# Patient Record
Sex: FEMALE | HISPANIC OR LATINO | Employment: UNEMPLOYED | URBAN - METROPOLITAN AREA
[De-identification: names, ages, dates, MRNs, and addresses within clinical notes are randomized per-mention and may not be internally consistent; named-entity substitution may affect disease eponyms.]

---

## 2019-12-04 ENCOUNTER — HOSPITAL ENCOUNTER (EMERGENCY)
Facility: MEDICAL CENTER | Age: 64
End: 2019-12-04
Attending: EMERGENCY MEDICINE
Payer: MEDICARE

## 2019-12-04 VITALS
WEIGHT: 136 LBS | OXYGEN SATURATION: 95 % | TEMPERATURE: 96.4 F | BODY MASS INDEX: 25.68 KG/M2 | DIASTOLIC BLOOD PRESSURE: 82 MMHG | RESPIRATION RATE: 16 BRPM | SYSTOLIC BLOOD PRESSURE: 134 MMHG | HEART RATE: 69 BPM | HEIGHT: 61 IN

## 2019-12-04 DIAGNOSIS — F33.9 EPISODE OF RECURRENT MAJOR DEPRESSIVE DISORDER, UNSPECIFIED DEPRESSION EPISODE SEVERITY (HCC): ICD-10-CM

## 2019-12-04 DIAGNOSIS — T50.904A DRUG OVERDOSE, UNDETERMINED INTENT, INITIAL ENCOUNTER: ICD-10-CM

## 2019-12-04 LAB
ALBUMIN SERPL BCP-MCNC: 3.8 G/DL (ref 3.2–4.9)
ALBUMIN/GLOB SERPL: 1.3 G/DL
ALP SERPL-CCNC: 95 U/L (ref 30–99)
ALT SERPL-CCNC: 45 U/L (ref 2–50)
AMPHET UR QL SCN: NEGATIVE
ANION GAP SERPL CALC-SCNC: 6 MMOL/L (ref 0–11.9)
APAP SERPL-MCNC: <10 UG/ML (ref 10–30)
AST SERPL-CCNC: 45 U/L (ref 12–45)
BARBITURATES UR QL SCN: NEGATIVE
BASOPHILS # BLD AUTO: 0.8 % (ref 0–1.8)
BASOPHILS # BLD: 0.05 K/UL (ref 0–0.12)
BENZODIAZ UR QL SCN: POSITIVE
BILIRUB SERPL-MCNC: 0.4 MG/DL (ref 0.1–1.5)
BUN SERPL-MCNC: 20 MG/DL (ref 8–22)
BZE UR QL SCN: NEGATIVE
CALCIUM SERPL-MCNC: 8.9 MG/DL (ref 8.5–10.5)
CANNABINOIDS UR QL SCN: NEGATIVE
CHLORIDE SERPL-SCNC: 105 MMOL/L (ref 96–112)
CO2 SERPL-SCNC: 28 MMOL/L (ref 20–33)
CREAT SERPL-MCNC: 1.07 MG/DL (ref 0.5–1.4)
EKG IMPRESSION: NORMAL
EOSINOPHIL # BLD AUTO: 0.21 K/UL (ref 0–0.51)
EOSINOPHIL NFR BLD: 3.3 % (ref 0–6.9)
ERYTHROCYTE [DISTWIDTH] IN BLOOD BY AUTOMATED COUNT: 48.9 FL (ref 35.9–50)
ETHANOL BLD-MCNC: 0 G/DL
GLOBULIN SER CALC-MCNC: 2.9 G/DL (ref 1.9–3.5)
GLUCOSE SERPL-MCNC: 111 MG/DL (ref 65–99)
HCG SERPL QL: NEGATIVE
HCT VFR BLD AUTO: 35.5 % (ref 37–47)
HGB BLD-MCNC: 11.4 G/DL (ref 12–16)
IMM GRANULOCYTES # BLD AUTO: 0.02 K/UL (ref 0–0.11)
IMM GRANULOCYTES NFR BLD AUTO: 0.3 % (ref 0–0.9)
LIPASE SERPL-CCNC: 24 U/L (ref 11–82)
LYMPHOCYTES # BLD AUTO: 2.87 K/UL (ref 1–4.8)
LYMPHOCYTES NFR BLD: 44.7 % (ref 22–41)
MCH RBC QN AUTO: 32.3 PG (ref 27–33)
MCHC RBC AUTO-ENTMCNC: 32.1 G/DL (ref 33.6–35)
MCV RBC AUTO: 100.6 FL (ref 81.4–97.8)
METHADONE UR QL SCN: NEGATIVE
MONOCYTES # BLD AUTO: 0.42 K/UL (ref 0–0.85)
MONOCYTES NFR BLD AUTO: 6.5 % (ref 0–13.4)
NEUTROPHILS # BLD AUTO: 2.85 K/UL (ref 2–7.15)
NEUTROPHILS NFR BLD: 44.4 % (ref 44–72)
NRBC # BLD AUTO: 0 K/UL
NRBC BLD-RTO: 0 /100 WBC
OPIATES UR QL SCN: NEGATIVE
OXYCODONE UR QL SCN: NEGATIVE
PCP UR QL SCN: NEGATIVE
PLATELET # BLD AUTO: 212 K/UL (ref 164–446)
PMV BLD AUTO: 10.3 FL (ref 9–12.9)
POC BREATHALIZER: 0 PERCENT (ref 0–0.01)
POTASSIUM SERPL-SCNC: 3.2 MMOL/L (ref 3.6–5.5)
PROPOXYPH UR QL SCN: NEGATIVE
PROT SERPL-MCNC: 6.7 G/DL (ref 6–8.2)
RBC # BLD AUTO: 3.53 M/UL (ref 4.2–5.4)
SALICYLATES SERPL-MCNC: 0 MG/DL (ref 15–25)
SODIUM SERPL-SCNC: 139 MMOL/L (ref 135–145)
WBC # BLD AUTO: 6.4 K/UL (ref 4.8–10.8)

## 2019-12-04 PROCEDURE — 80307 DRUG TEST PRSMV CHEM ANLYZR: CPT

## 2019-12-04 PROCEDURE — 83690 ASSAY OF LIPASE: CPT

## 2019-12-04 PROCEDURE — 80053 COMPREHEN METABOLIC PANEL: CPT

## 2019-12-04 PROCEDURE — 85025 COMPLETE CBC W/AUTO DIFF WBC: CPT

## 2019-12-04 PROCEDURE — A9270 NON-COVERED ITEM OR SERVICE: HCPCS | Performed by: STUDENT IN AN ORGANIZED HEALTH CARE EDUCATION/TRAINING PROGRAM

## 2019-12-04 PROCEDURE — 700102 HCHG RX REV CODE 250 W/ 637 OVERRIDE(OP): Performed by: STUDENT IN AN ORGANIZED HEALTH CARE EDUCATION/TRAINING PROGRAM

## 2019-12-04 PROCEDURE — 302970 POC BREATHALIZER: Performed by: EMERGENCY MEDICINE

## 2019-12-04 PROCEDURE — 84703 CHORIONIC GONADOTROPIN ASSAY: CPT

## 2019-12-04 PROCEDURE — 36415 COLL VENOUS BLD VENIPUNCTURE: CPT

## 2019-12-04 PROCEDURE — 99284 EMERGENCY DEPT VISIT MOD MDM: CPT | Performed by: PSYCHIATRY & NEUROLOGY

## 2019-12-04 PROCEDURE — 93005 ELECTROCARDIOGRAM TRACING: CPT | Performed by: EMERGENCY MEDICINE

## 2019-12-04 PROCEDURE — 700105 HCHG RX REV CODE 258: Performed by: EMERGENCY MEDICINE

## 2019-12-04 PROCEDURE — 99285 EMERGENCY DEPT VISIT HI MDM: CPT

## 2019-12-04 RX ORDER — BUSPIRONE HYDROCHLORIDE 10 MG/1
10 TABLET ORAL 2 TIMES DAILY
Status: DISCONTINUED | OUTPATIENT
Start: 2019-12-04 | End: 2019-12-04 | Stop reason: HOSPADM

## 2019-12-04 RX ORDER — QUETIAPINE FUMARATE 100 MG/1
200 TABLET, FILM COATED ORAL EVERY EVENING
Status: DISCONTINUED | OUTPATIENT
Start: 2019-12-04 | End: 2019-12-04 | Stop reason: HOSPADM

## 2019-12-04 RX ORDER — PROPRANOLOL HYDROCHLORIDE 10 MG/1
10 TABLET ORAL TWICE DAILY
Status: DISCONTINUED | OUTPATIENT
Start: 2019-12-04 | End: 2019-12-04 | Stop reason: HOSPADM

## 2019-12-04 RX ORDER — SODIUM CHLORIDE 9 MG/ML
1000 INJECTION, SOLUTION INTRAVENOUS ONCE
Status: COMPLETED | OUTPATIENT
Start: 2019-12-04 | End: 2019-12-04

## 2019-12-04 RX ORDER — CLONAZEPAM 0.5 MG/1
0.5 TABLET ORAL 2 TIMES DAILY
Status: DISCONTINUED | OUTPATIENT
Start: 2019-12-04 | End: 2019-12-04 | Stop reason: HOSPADM

## 2019-12-04 RX ADMIN — PROPRANOLOL HYDROCHLORIDE 10 MG: 10 TABLET ORAL at 15:03

## 2019-12-04 RX ADMIN — SERTRALINE HYDROCHLORIDE 50 MG: 50 TABLET ORAL at 15:03

## 2019-12-04 RX ADMIN — SODIUM CHLORIDE 1000 ML: 9 INJECTION, SOLUTION INTRAVENOUS at 05:18

## 2019-12-04 NOTE — ED NOTES
Woken from sleep, easily     Hourly rounding completed  Patient is up to date on current plan of care  Patient is resting comfortably in bed   Patient denies any need for use of the restroom, denies need for repositioning, denies need for any comfort care at this time     Sitter remains at bedside for 1:1 continuous observation
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
## 2019-12-04 NOTE — PSYCHIATRY
BRIEF PSYCHIATRIC CONSULT NOTE: patient seen, full note to follow.  -Legal Hold:extended  -Sitter:yes  -Restrictions:   -phone:yes      -Orders Placed: routine  -Assessment:    PT is admitting to wanting to die and feeling better off dead.     -Plan:continue to follow     Restarting antidepressant

## 2019-12-04 NOTE — ED NOTES
Ambulated well to the bathroom     Continuous observation maintained by RN     Provided with materials for brushing her teeth, deodorant, and hair comb; items used under observation only

## 2019-12-04 NOTE — ED NOTES
Per the Psychiatrist, patient now admitting to active thoughts and desire to commit suicide, will stay for transfer

## 2019-12-04 NOTE — ED NOTES
Never ate the lunch provided to her, she was offered a frozen meal from ED supply, to which she agreed

## 2019-12-04 NOTE — ED NOTES
Spoke with Dannielle at [facility], gave RN to RN; awaiting to hear back about possible admission

## 2019-12-04 NOTE — DISCHARGE PLANNING
Medical Social Work    Referral: Legal Hold    Intervention: Legal Hold Paperwork given to SW by [employer] RN Ximena Henderson    Legal Hold Initiated: Date: 12- Time: 1053    Patient’s Insurance Listed on Face Sheet: [employer] Medicare HMO    Referrals sent to: [facility], [facility] , [facility], [facility]    This referral contains the following information:  1) Face sheet __x__  2) Page 1 and Page 2 of Legal Hold __x__  3) Alert Team Assessment/Psych Assessment _x___  4) Head to toe physical exam ____x  5) Urine Drug Screen _x___  6) Blood Alcohol __x__  7) Vital signs __x__  8) Pregnancy test when applicable _NA__  9) Medications list ___x_    Plan: Patient will transfer to mental health facility once acceptance is obtained

## 2019-12-04 NOTE — DISCHARGE PLANNING
LSW completed [facility] transportation form and faxed to [facility]     LSW spoke with [facility] staff Rosalio to confirm transportatoin for 4pm to [place]     LEO informed bedside RN   LEO informed [place]   KHADARW placed packet on chart

## 2019-12-04 NOTE — ED NOTES
This RN received hand off report on patient from Iván COLÓN   This RN's primary care of patient began at this time

## 2019-12-04 NOTE — ED NOTES
Patient woken from sleep for re assessment   She is oriented but drowsy     Hourly rounding completed  Patient is up to date on current plan of care  Patient is resting comfortably in bed   Patient denies any need for use of the restroom, denies need for repositioning, denies need for any comfort care at this time     Sitter remains at bedside for 1:1 continuous observation

## 2019-12-04 NOTE — ED NOTES
Hourly rounding completed  Patient is up to date on current plan of care  Patient is resting comfortably in bed   Patient denies any need for use of the restroom, denies need for repositioning, denies need for any comfort care at this time     Sitter remains at bedside for 1:1 continuous observation

## 2019-12-04 NOTE — ED PROVIDER NOTES
ED Provider Note    CHIEF COMPLAINT  Chief Complaint   Patient presents with   • Suicide Attempt   • Drug Ingestion     1.5mg Xanax, 1-3 seroquel (300-900mg)        HPI  Erica Asencio is a 64 y.o. female who presents because of nausea and abdominal pain.  Patient reports she has been feeling very angry and sad.  States that she feels hopeless.  She states she just wanted to go to sleep last night and took Xanax and Seroquel.  Nurses note stated suicide attempt but she says she just wanted to sleep and the medications that she took were not helping her.  She did not want to die.  She does frequently have suicidal thoughts however.  She took 1.5 mg of Xanax and 3 Seroquel tablets.  She tells me she took them an hour and a half ago.  She told the nurse that she took them at 7 PM.  Denies any alcohol.  She has been dealing with intermittent central epigastric abdominal pain and left upper quadrant abdominal pain.  She had abdominal pain earlier but this went away.  She does feel slightly nauseous and feels like she has to have a bowel movement.  She has not had black or bloody stool.  She has not had a fever or chills.  No dysuria or hematuria.  Denies chest pain or shortness of breath.    REVIEW OF SYSTEMS  As per HPI, otherwise a 10 point review of systems is negative    PAST MEDICAL HISTORY  Past Medical History:   Diagnosis Date   • Anxiety    • Hypertension    • Hyperthyroidism        SOCIAL HISTORY  Social History     Tobacco Use   • Smoking status: Never Smoker   Substance Use Topics   • Alcohol use: No     Comment: none x 1 year   • Drug use: No       SURGICAL HISTORY  Past Surgical History:   Procedure Laterality Date   • YARON BY LAPAROSCOPY         CURRENT MEDICATIONS  Home Medications    **Home medications have not yet been reviewed for this encounter**         ALLERGIES  No Known Allergies    PHYSICAL EXAM  VITAL SIGNS: BP (!) 91/63   Pulse 72   Temp (!) 35.8 °C (96.4 °F) (Temporal)   Resp 16   Ht  "1.549 m (5' 1\")   Wt 61.7 kg (136 lb)   SpO2 94%   BMI 25.70 kg/m²    Constitutional: Awake and alert  HENT:  Atraumatic, Normocephalic.Oropharynx dry mucus membranes, Nose normal inspection.   Eyes: Normal inspection  Neck: Supple  Cardiovascular: Normal heart rate, Normal rhythm.  Symmetric peripheral pulses.   Thorax & Lungs: No respiratory distress, No wheezing, No rales, No rhonchi, No chest tenderness.   Abdomen: Bowel sounds normal, soft, non-distended, nontender, no mass  Skin: Warm, Dry, No rash.   Back: No tenderness, No CVA tenderness.   Extremities: No clubbing, cyanosis, edema, no Homans or cords   Neurologic: Grossly normal   Psychiatric: Tearful periodically.  Denies being actively suicidal        Labs:  Results for orders placed or performed during the hospital encounter of 12/04/19   Urine Drug Screen   Result Value Ref Range    Amphetamines Urine Negative Negative    Barbiturates Negative Negative    Benzodiazepines Positive (A) Negative    Cocaine Metabolite Negative Negative    Methadone Negative Negative    Opiates Negative Negative    Oxycodone Negative Negative    Phencyclidine -Pcp Negative Negative    Propoxyphene Negative Negative    Cannabinoid Metab Negative Negative   DIAGNOSTIC ALCOHOL (BA)   Result Value Ref Range    Diagnostic Alcohol 0.00 0.00 g/dL   CBC WITH DIFFERENTIAL   Result Value Ref Range    WBC 6.4 4.8 - 10.8 K/uL    RBC 3.53 (L) 4.20 - 5.40 M/uL    Hemoglobin 11.4 (L) 12.0 - 16.0 g/dL    Hematocrit 35.5 (L) 37.0 - 47.0 %    .6 (H) 81.4 - 97.8 fL    MCH 32.3 27.0 - 33.0 pg    MCHC 32.1 (L) 33.6 - 35.0 g/dL    RDW 48.9 35.9 - 50.0 fL    Platelet Count 212 164 - 446 K/uL    MPV 10.3 9.0 - 12.9 fL    Neutrophils-Polys 44.40 44.00 - 72.00 %    Lymphocytes 44.70 (H) 22.00 - 41.00 %    Monocytes 6.50 0.00 - 13.40 %    Eosinophils 3.30 0.00 - 6.90 %    Basophils 0.80 0.00 - 1.80 %    Immature Granulocytes 0.30 0.00 - 0.90 %    Nucleated RBC 0.00 /100 WBC    Neutrophils " (Absolute) 2.85 2.00 - 7.15 K/uL    Lymphs (Absolute) 2.87 1.00 - 4.80 K/uL    Monos (Absolute) 0.42 0.00 - 0.85 K/uL    Eos (Absolute) 0.21 0.00 - 0.51 K/uL    Baso (Absolute) 0.05 0.00 - 0.12 K/uL    Immature Granulocytes (abs) 0.02 0.00 - 0.11 K/uL    NRBC (Absolute) 0.00 K/uL   COMP METABOLIC PANEL   Result Value Ref Range    Sodium 139 135 - 145 mmol/L    Potassium 3.2 (L) 3.6 - 5.5 mmol/L    Chloride 105 96 - 112 mmol/L    Co2 28 20 - 33 mmol/L    Anion Gap 6.0 0.0 - 11.9    Glucose 111 (H) 65 - 99 mg/dL    Bun 20 8 - 22 mg/dL    Creatinine 1.07 0.50 - 1.40 mg/dL    Calcium 8.9 8.5 - 10.5 mg/dL    AST(SGOT) 45 12 - 45 U/L    ALT(SGPT) 45 2 - 50 U/L    Alkaline Phosphatase 95 30 - 99 U/L    Total Bilirubin 0.4 0.1 - 1.5 mg/dL    Albumin 3.8 3.2 - 4.9 g/dL    Total Protein 6.7 6.0 - 8.2 g/dL    Globulin 2.9 1.9 - 3.5 g/dL    A-G Ratio 1.3 g/dL   SALICYLATE LEVEL   Result Value Ref Range    Salicylates, Quant. 0 (L) 15 - 25 mg/dL   Acetaminophen Level   Result Value Ref Range    Acetaminophen -Tylenol <10 10 - 30 ug/mL   HCG Qual Serum   Result Value Ref Range    Beta-Hcg Qualitative Serum Negative Negative   LIPASE   Result Value Ref Range    Lipase 24 11 - 82 U/L   ESTIMATED GFR   Result Value Ref Range    GFR If African American >60 >60 mL/min/1.73 m 2    GFR If Non  51 (A) >60 mL/min/1.73 m 2   POC BREATHALIZER   Result Value Ref Range    POC Breathalizer 0.003 0.00 - 0.01 Percent   EKG   Result Value Ref Range    Report       [facility] Emergency Dept.    Test Date:  2019  Pt Name:    AMAYA GONSALEZ               Department: ER  MRN:        [id number]                      Room:        37  Gender:     Female                       Technician: [id number]  :        1955                   Requested By:MARINO MAYFIELD  Order #:    [id number]                    Reading MD:    Measurements  Intervals                                Axis  Rate:       75                            P:          44  MO:         164                          QRS:        57  QRSD:       88                           T:          40  QT:         424  QTc:        474    Interpretive Statements  SINUS RHYTHM  Compared to ECG 02/12/2014 08:55:31  No significant changes           COURSE & MEDICAL DECISION MAKING  Patient presents after an overdose on Xanax as well as Seroquel.  She states that this was not an attempt to kill herself, but certainly this was inappropriate medication use.  She states she is depressed.  She is tearful.  She states that she is hopeless and has had suicidal thoughts.  Patient is evaluated with laboratory data and EKG as noted above.  No evidence of toxicity.  She is medically cleared.  I am worried that she is a danger to herself.  I have completed a legal hold.  We will have psychiatry evaluate the patient.      FINAL IMPRESSION  1.  Overdose, sedatives  2.  Depression    Ms. Asencio was here with a confirmed overdose of T42 - Antiepileptic, sedative-hypnotic, or anti-Parkinsonism drugs; she has no other known overdoses.        This dictation was created using voice recognition software. The accuracy of the dictation is limited to the abilities of the software.  The nursing notes were reviewed and certain aspects of this information were incorporated into this note.      Electronically signed by: Patricio Riojas, 12/4/2019 4:41 AM

## 2019-12-04 NOTE — ED NOTES
"While speaking with patient, she states that she was not trying to kill herself last night, states she only took the medication to sleep   Patient is ambivalent and backtracks on her words often when questioned on suicidal ideation. Patient makes the statement regarding killing herself \"I know how I would do it.\" but also states \"No I don't want to kill myself.\" Patient admits to suicidal ideations as recently as a week ago, but denies any previous attempt. She is admitting a plan without attempt, intent or desire at this time.     Patient remains voluntary, cooperative, and with sitter bedside     "

## 2019-12-04 NOTE — ED TRIAGE NOTES
Chief Complaint   Patient presents with   • Suicide Attempt   • Drug Ingestion     1.5mg Xanax, 1-3 seroquel (300-900mg)       Pt bib ems from home after pt took her home xanax and seroquel. Pt initially reported to ems she did so to sleep, however later admitted SI.  Pt reports taking one xanax at 1900, followed by the remaining xanax and seroquel around 0300 for the above totals.    Pt arrives AAOx4, solmnulent.  Pt slightly hypotensive, systolics in low 90's. Belongings removed, medically necessary equipment remains.      ERP to see.

## 2019-12-04 NOTE — ED NOTES
RN to bedside for safety and rounding. Patient is asleep. In attempt to keep a calm and healthy environment for patient, RN delayed waking patient for an update at this time.  Noted chest rise and fall with ease of respirations     Sitter remains at bedside for 1:1 continuous observation

## 2019-12-05 NOTE — ED NOTES
Hand off report given to OSMIN  Patient chart and current status reviewed with crew and all questions answered  This RN's primary care of patient terminated at this time

## 2021-03-03 DIAGNOSIS — Z23 NEED FOR VACCINATION: ICD-10-CM
